# Patient Record
Sex: MALE | Race: WHITE | NOT HISPANIC OR LATINO | URBAN - METROPOLITAN AREA
[De-identification: names, ages, dates, MRNs, and addresses within clinical notes are randomized per-mention and may not be internally consistent; named-entity substitution may affect disease eponyms.]

---

## 2023-02-17 ENCOUNTER — EMERGENCY (EMERGENCY)
Facility: HOSPITAL | Age: 18
LOS: 1 days | Discharge: HOME | End: 2023-02-17
Attending: EMERGENCY MEDICINE
Payer: SELF-PAY

## 2023-02-17 VITALS
OXYGEN SATURATION: 98 % | WEIGHT: 207.9 LBS | RESPIRATION RATE: 16 BRPM | HEIGHT: 68 IN | HEART RATE: 86 BPM | SYSTOLIC BLOOD PRESSURE: 121 MMHG | TEMPERATURE: 98 F | DIASTOLIC BLOOD PRESSURE: 76 MMHG

## 2023-02-17 PROCEDURE — 99283 EMERGENCY DEPT VISIT LOW MDM: CPT

## 2023-02-17 PROCEDURE — 99053 MED SERV 10PM-8AM 24 HR FAC: CPT

## 2023-02-17 PROCEDURE — 99284 EMERGENCY DEPT VISIT MOD MDM: CPT

## 2023-02-17 PROCEDURE — 73590 X-RAY EXAM OF LOWER LEG: CPT

## 2023-02-17 PROCEDURE — 73590 X-RAY EXAM OF LOWER LEG: CPT | Mod: 26,LT

## 2023-02-17 RX ORDER — IBUPROFEN 200 MG
600 TABLET ORAL ONCE
Refills: 0 | Status: COMPLETED | OUTPATIENT
Start: 2023-02-17 | End: 2023-02-17

## 2023-02-17 RX ORDER — ACETAMINOPHEN 500 MG
650 TABLET ORAL ONCE
Refills: 0 | Status: COMPLETED | OUTPATIENT
Start: 2023-02-17 | End: 2023-02-17

## 2023-02-17 RX ADMIN — Medication 650 MILLIGRAM(S): at 01:19

## 2023-02-17 RX ADMIN — Medication 600 MILLIGRAM(S): at 01:18
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
## 2023-02-17 NOTE — ED PROVIDER NOTE - NSFOLLOWUPINSTRUCTIONS_ED_ALL_ED_FT
[facility]                                                                                                                                                                                                                                                 Contusion      A contusion is a deep bruise. Contusions are the result of a blunt injury to tissues and muscle fibers under the skin. The injury causes bleeding under the skin. The skin overlying the contusion may turn blue, purple, or yellow. Minor injuries will give you a painless contusion, but more severe injuries cause contusions that may stay painful and swollen for a few weeks.      Follow these instructions at home:    Pay attention to any changes in your symptoms. Let your health care provider know about them. Take these actions to relieve your pain.      Managing pain, stiffness, and swelling    •Use resting, icing, applying pressure (compression), and raising (elevating) the injured area. This is often called the RICE strategy.  •Rest the injured area. Return to your normal activities as told by your health care provider. Ask your health care provider what activities are safe for you.    •If directed, put ice on the injured area:  •Put ice in a plastic bag.      •Place a towel between your skin and the bag.      •Leave the ice on for 20 minutes, 2–3 times per day.        •If directed, apply light compression to the injured area using an elastic bandage. Make sure the bandage is not wrapped too tightly. Remove and reapply the bandage as directed by your health care provider.      •If possible, raise (elevate) the injured area above the level of your heart while you are sitting or lying down.        General instructions     •Take over-the-counter and prescription medicines only as told by your health care provider.      •Keep all follow-up visits as told by your health care provider. This is important.        Contact a health care provider if:    •Your symptoms do not improve after several days of treatment.      •Your symptoms get worse.      •You have difficulty moving the injured area.        Get help right away if:    •You have severe pain.      •You have numbness in a hand or foot.      •Your hand or foot turns pale or cold.        Summary    •A contusion is a deep bruise.      •Contusions are the result of a blunt injury to tissues and muscle fibers under the skin.      •It is treated with rest, ice, compression, and elevation. You may be given over-the-counter medicines for pain.      •Contact a health care provider if your symptoms do not improve, or get worse.       •Get help right away if you have severe pain, have numbness, or the area turns pale or cold.      This information is not intended to replace advice given to you by your health care provider. Make sure you discuss any questions you have with your health care provider.      Document Revised: 08/08/2019 Document Reviewed: 08/08/2019    [employer]vier Patient Education © 2022 [employer]
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
## 2023-02-17 NOTE — ED PROVIDER NOTE - PATIENT PORTAL LINK FT
You can access the FollowMyHealth Patient Portal offered by [facility] by registering at the following website: [email or web address]. By joining [employer]’s FollowMyHealth portal, you will also be able to view your health information using other applications (apps) compatible with our system.
